# Patient Record
Sex: MALE | Race: WHITE | NOT HISPANIC OR LATINO | URBAN - METROPOLITAN AREA
[De-identification: names, ages, dates, MRNs, and addresses within clinical notes are randomized per-mention and may not be internally consistent; named-entity substitution may affect disease eponyms.]

---

## 2017-07-05 ENCOUNTER — EMERGENCY (EMERGENCY)
Facility: HOSPITAL | Age: 37
LOS: 1 days | Discharge: HOME | End: 2017-07-05
Attending: EMERGENCY MEDICINE
Payer: MEDICAID

## 2017-07-05 VITALS
RESPIRATION RATE: 20 BRPM | HEIGHT: 69 IN | HEART RATE: 86 BPM | WEIGHT: 177.03 LBS | TEMPERATURE: 98 F | OXYGEN SATURATION: 99 % | DIASTOLIC BLOOD PRESSURE: 80 MMHG | SYSTOLIC BLOOD PRESSURE: 125 MMHG
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
## 2017-07-05 DIAGNOSIS — M25.562 PAIN IN LEFT KNEE: ICD-10-CM
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
## 2017-07-05 DIAGNOSIS — M25.462 EFFUSION, LEFT KNEE: ICD-10-CM

## 2017-07-05 PROCEDURE — 73562 X-RAY EXAM OF KNEE 3: CPT | Mod: 26,LT

## 2017-07-05 PROCEDURE — 99283 EMERGENCY DEPT VISIT LOW MDM: CPT

## 2017-07-05 PROCEDURE — 99284 EMERGENCY DEPT VISIT MOD MDM: CPT | Mod: 25

## 2017-07-05 PROCEDURE — 73562 X-RAY EXAM OF KNEE 3: CPT

## 2017-07-05 RX ORDER — IBUPROFEN 200 MG
600 TABLET ORAL ONCE
Qty: 0 | Refills: 0 | Status: COMPLETED | OUTPATIENT
Start: 2017-07-05 | End: 2017-07-05

## 2017-07-05 RX ADMIN — Medication 600 MILLIGRAM(S): at 23:20

## 2017-07-05 RX ADMIN — Medication 600 MILLIGRAM(S): at 23:30

## 2017-07-05 NOTE — ED PROVIDER NOTE - OBJECTIVE STATEMENT
Friend as [place] . 37 y/o M pt with no significant PMHx and no significant PSHx presents to ED c/o L knee pain with associated L knee swelling s/p mechanical fall yesterday. Pt states he fell off of a bed yesterday while playing with his son, ultimately landing on his L knee. Pt also reports L knee pain is worse with extension. Pt denies numbness, tingling, or any other complaints. NKDA. Friend as [place] . 37 y/o M pt with no significant PMHx and no significant PSHx presents to ED c/o L knee pain with associated L knee swelling s/p mechanical fall yesterday. Pt states he fell off of a bed yesterday while playing with his son, landing on his L knee. Pt also reports L knee pain is worse with extension. Pt denies numbness, tingling, or any other complaints. NKDA.

## 2017-07-05 NOTE — ED PROVIDER NOTE - MEDICAL DECISION MAKING DETAILS
left knee pain after fall, full rom, exam pertinent for no swelling or bruising. xr no fx or dislocation. pain improved with meds. discussed anticipatory guidance. ortho referral in case pain persists

## 2017-07-05 NOTE — ED PROVIDER NOTE - CONDUCTED A DETAILED DISCUSSION WITH PATIENT AND/OR GUARDIAN REGARDING, MDM
radiology results/need for outpatient follow-up return to ED if symptoms worsen, persist or questions arise/radiology results/need for outpatient follow-up

## 2017-07-05 NOTE — ED PROVIDER NOTE - LOWER EXTREMITY EXAM, LEFT
L knee tenderness with varus stress; medial aspect of L knee is tender with no swelling or bruising; passive full range of motion

## 2017-08-08 ENCOUNTER — EMERGENCY (EMERGENCY)
Facility: HOSPITAL | Age: 37
LOS: 1 days | Discharge: HOME | End: 2017-08-08
Attending: EMERGENCY MEDICINE
Payer: MEDICAID

## 2017-08-08 VITALS
WEIGHT: 187.39 LBS | TEMPERATURE: 99 F | HEIGHT: 73.62 IN | DIASTOLIC BLOOD PRESSURE: 76 MMHG | SYSTOLIC BLOOD PRESSURE: 125 MMHG | RESPIRATION RATE: 15 BRPM | OXYGEN SATURATION: 97 % | HEART RATE: 96 BPM

## 2017-08-08 DIAGNOSIS — Z90.49 ACQUIRED ABSENCE OF OTHER SPECIFIED PARTS OF DIGESTIVE TRACT: ICD-10-CM

## 2017-08-08 DIAGNOSIS — Z90.49 ACQUIRED ABSENCE OF OTHER SPECIFIED PARTS OF DIGESTIVE TRACT: Chronic | ICD-10-CM

## 2017-08-08 DIAGNOSIS — G44.209 TENSION-TYPE HEADACHE, UNSPECIFIED, NOT INTRACTABLE: ICD-10-CM

## 2017-08-08 PROCEDURE — 99283 EMERGENCY DEPT VISIT LOW MDM: CPT

## 2017-08-08 PROCEDURE — 99284 EMERGENCY DEPT VISIT MOD MDM: CPT

## 2017-08-08 RX ORDER — TRAMADOL HYDROCHLORIDE 50 MG/1
25 TABLET ORAL ONCE
Qty: 0 | Refills: 0 | Status: DISCONTINUED | OUTPATIENT
Start: 2017-08-08 | End: 2017-08-08

## 2017-08-08 RX ORDER — ACETAMINOPHEN 500 MG
650 TABLET ORAL ONCE
Qty: 0 | Refills: 0 | Status: COMPLETED | OUTPATIENT
Start: 2017-08-08 | End: 2017-08-08

## 2017-08-08 RX ORDER — IBUPROFEN 200 MG
600 TABLET ORAL ONCE
Qty: 0 | Refills: 0 | Status: COMPLETED | OUTPATIENT
Start: 2017-08-08 | End: 2017-08-08

## 2017-08-08 RX ORDER — TRAMADOL HYDROCHLORIDE 50 MG/1
1 TABLET ORAL
Qty: 9 | Refills: 0 | OUTPATIENT
Start: 2017-08-08 | End: 2017-08-11

## 2017-08-08 RX ADMIN — TRAMADOL HYDROCHLORIDE 25 MILLIGRAM(S): 50 TABLET ORAL at 10:29

## 2017-08-08 RX ADMIN — Medication 600 MILLIGRAM(S): at 10:29

## 2017-08-08 RX ADMIN — Medication 650 MILLIGRAM(S): at 10:29

## 2017-08-08 NOTE — ED PROVIDER NOTE - PHYSICAL EXAMINATION
NEURO- uncomfortable when touching R side of head. Points tenderness at the R occipital and frontal forehead region.

## 2017-08-08 NOTE — ED PROVIDER NOTE - MEDICAL DECISION MAKING DETAILS
33 y/o M pt with no significant PMHx, active smoker and daily caffeine user presents with gradual onset R sided headache without any red flags. Likely a tension type headache. Will give tylenols, Motrin, Tramadol and reassess. No need for imaging or further invasive testing.

## 2017-08-08 NOTE — ED ADULT NURSE NOTE - OBJECTIVE STATEMENT
pt is here c/o severe headache , as per pt it started one week ago but increased in severity 2 days ago - pt denies nausea , pt is pacing holding his head , denies any trauma or vomiting

## 2017-08-08 NOTE — ED PROVIDER NOTE - PROGRESS NOTE DETAILS
villarreal: headache, improved.  no neurological deficit.  f/u with neurology and tramadol.   dx headache.  rx tramadol/tylenol/motrin.  f/u with neurology and return if symptoms worsens.

## 2017-08-08 NOTE — ED PROVIDER NOTE - CHPI ED SYMPTOMS NEG
no vomiting/no fever/no chills, no tooth ache, no URI sx, no neck stiffness, no trauma, no focal weakness

## 2017-08-08 NOTE — ED ADULT NURSE NOTE - CHPI ED SYMPTOMS NEG
no blurred vision/no change in level of consciousness/no vomiting/no loss of consciousness/no fever/no confusion/no dizziness

## 2017-08-08 NOTE — ED PROVIDER NOTE - OBJECTIVE STATEMENT
35 y/o M pt with no PMHx and a PSHx of appendectomy presents to ED c/o gradual onset of a worsening, stabbing R sided headache x1 week, improved with sleep. 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 Pt reports he went to see a neurologist yesterday and was given Gabapentin, to no relief. Pt denies taking any tylenols or Motrin. Pt also denies fever, chills, tooth ache, nausea, vomiting, URI sx, FHx of aneurysms, neck stiffness, trauma, focal weakness or any other complaints. Active smoker and daily caffeine use. NKDA.

## 2017-10-25 ENCOUNTER — EMERGENCY (EMERGENCY)
Facility: HOSPITAL | Age: 37
LOS: 1 days | Discharge: HOME | End: 2017-10-25
Attending: EMERGENCY MEDICINE | Admitting: EMERGENCY MEDICINE
Payer: MEDICAID

## 2017-10-25 VITALS
SYSTOLIC BLOOD PRESSURE: 140 MMHG | OXYGEN SATURATION: 100 % | TEMPERATURE: 98 F | HEART RATE: 84 BPM | RESPIRATION RATE: 16 BRPM | DIASTOLIC BLOOD PRESSURE: 94 MMHG

## 2017-10-25 DIAGNOSIS — Z90.49 ACQUIRED ABSENCE OF OTHER SPECIFIED PARTS OF DIGESTIVE TRACT: Chronic | ICD-10-CM

## 2017-10-25 LAB
ALBUMIN SERPL ELPH-MCNC: 4.8 G/DL — SIGNIFICANT CHANGE UP (ref 3.3–5)
ALP SERPL-CCNC: 72 U/L — SIGNIFICANT CHANGE UP (ref 40–120)
ALT FLD-CCNC: 29 U/L — SIGNIFICANT CHANGE UP (ref 4–41)
AST SERPL-CCNC: 26 U/L — SIGNIFICANT CHANGE UP (ref 4–40)
BASOPHILS # BLD AUTO: 0.05 K/UL — SIGNIFICANT CHANGE UP (ref 0–0.2)
BASOPHILS NFR BLD AUTO: 0.6 % — SIGNIFICANT CHANGE UP (ref 0–2)
BILIRUB SERPL-MCNC: 0.3 MG/DL — SIGNIFICANT CHANGE UP (ref 0.2–1.2)
BUN SERPL-MCNC: 24 MG/DL — HIGH (ref 7–23)
CALCIUM SERPL-MCNC: 9.6 MG/DL — SIGNIFICANT CHANGE UP (ref 8.4–10.5)
CHLORIDE SERPL-SCNC: 106 MMOL/L — SIGNIFICANT CHANGE UP (ref 98–107)
CO2 SERPL-SCNC: 26 MMOL/L — SIGNIFICANT CHANGE UP (ref 22–31)
CREAT SERPL-MCNC: 1.42 MG/DL — HIGH (ref 0.5–1.3)
EOSINOPHIL # BLD AUTO: 0.17 K/UL — SIGNIFICANT CHANGE UP (ref 0–0.5)
EOSINOPHIL NFR BLD AUTO: 2.2 % — SIGNIFICANT CHANGE UP (ref 0–6)
GLUCOSE SERPL-MCNC: 95 MG/DL — SIGNIFICANT CHANGE UP (ref 70–99)
HCT VFR BLD CALC: 43.1 % — SIGNIFICANT CHANGE UP (ref 39–50)
HGB BLD-MCNC: 13.9 G/DL — SIGNIFICANT CHANGE UP (ref 13–17)
IMM GRANULOCYTES # BLD AUTO: 0.02 # — SIGNIFICANT CHANGE UP
IMM GRANULOCYTES NFR BLD AUTO: 0.3 % — SIGNIFICANT CHANGE UP (ref 0–1.5)
LYMPHOCYTES # BLD AUTO: 2.23 K/UL — SIGNIFICANT CHANGE UP (ref 1–3.3)
LYMPHOCYTES # BLD AUTO: 28.9 % — SIGNIFICANT CHANGE UP (ref 13–44)
MCHC RBC-ENTMCNC: 27.9 PG — SIGNIFICANT CHANGE UP (ref 27–34)
MCHC RBC-ENTMCNC: 32.3 % — SIGNIFICANT CHANGE UP (ref 32–36)
MCV RBC AUTO: 86.5 FL — SIGNIFICANT CHANGE UP (ref 80–100)
MONOCYTES # BLD AUTO: 0.63 K/UL — SIGNIFICANT CHANGE UP (ref 0–0.9)
MONOCYTES NFR BLD AUTO: 8.2 % — SIGNIFICANT CHANGE UP (ref 2–14)
NEUTROPHILS # BLD AUTO: 4.61 K/UL — SIGNIFICANT CHANGE UP (ref 1.8–7.4)
NEUTROPHILS NFR BLD AUTO: 59.8 % — SIGNIFICANT CHANGE UP (ref 43–77)
NRBC # FLD: 0 — SIGNIFICANT CHANGE UP
PLATELET # BLD AUTO: 175 K/UL — SIGNIFICANT CHANGE UP (ref 150–400)
PMV BLD: 9.3 FL — SIGNIFICANT CHANGE UP (ref 7–13)
POTASSIUM SERPL-MCNC: 4.6 MMOL/L — SIGNIFICANT CHANGE UP (ref 3.5–5.3)
POTASSIUM SERPL-SCNC: 4.6 MMOL/L — SIGNIFICANT CHANGE UP (ref 3.5–5.3)
PROT SERPL-MCNC: 7.7 G/DL — SIGNIFICANT CHANGE UP (ref 6–8.3)
RBC # BLD: 4.98 M/UL — SIGNIFICANT CHANGE UP (ref 4.2–5.8)
RBC # FLD: 12.8 % — SIGNIFICANT CHANGE UP (ref 10.3–14.5)
SODIUM SERPL-SCNC: 142 MMOL/L — SIGNIFICANT CHANGE UP (ref 135–145)
WBC # BLD: 7.71 K/UL — SIGNIFICANT CHANGE UP (ref 3.8–10.5)
WBC # FLD AUTO: 7.71 K/UL — SIGNIFICANT CHANGE UP (ref 3.8–10.5)

## 2017-10-25 PROCEDURE — 99284 EMERGENCY DEPT VISIT MOD MDM: CPT

## 2017-10-25 RX ORDER — METOCLOPRAMIDE HCL 10 MG
10 TABLET ORAL ONCE
Qty: 0 | Refills: 0 | Status: COMPLETED | OUTPATIENT
Start: 2017-10-25 | End: 2017-10-25

## 2017-10-25 RX ORDER — DIPHENHYDRAMINE HCL 50 MG
50 CAPSULE ORAL ONCE
Qty: 0 | Refills: 0 | Status: COMPLETED | OUTPATIENT
Start: 2017-10-25 | End: 2017-10-25

## 2017-10-25 RX ORDER — MORPHINE SULFATE 50 MG/1
4 CAPSULE, EXTENDED RELEASE ORAL ONCE
Qty: 0 | Refills: 0 | Status: DISCONTINUED | OUTPATIENT
Start: 2017-10-25 | End: 2017-10-25

## 2017-10-25 RX ORDER — KETOROLAC TROMETHAMINE 30 MG/ML
30 SYRINGE (ML) INJECTION ONCE
Qty: 0 | Refills: 0 | Status: DISCONTINUED | OUTPATIENT
Start: 2017-10-25 | End: 2017-10-25

## 2017-10-25 RX ORDER — FAMOTIDINE 10 MG/ML
20 INJECTION INTRAVENOUS ONCE
Qty: 0 | Refills: 0 | Status: COMPLETED | OUTPATIENT
Start: 2017-10-25 | End: 2017-10-25

## 2017-10-25 RX ADMIN — Medication 30 MILLIGRAM(S): at 19:21

## 2017-10-25 RX ADMIN — MORPHINE SULFATE 4 MILLIGRAM(S): 50 CAPSULE, EXTENDED RELEASE ORAL at 19:20

## 2017-10-25 RX ADMIN — Medication 10 MILLIGRAM(S): at 19:22

## 2017-10-25 RX ADMIN — FAMOTIDINE 20 MILLIGRAM(S): 10 INJECTION INTRAVENOUS at 19:20

## 2017-10-25 RX ADMIN — Medication 50 MILLIGRAM(S): at 19:21

## 2017-10-25 NOTE — ED ADULT NURSE NOTE - OBJECTIVE STATEMENT
Hx of headaches x 2 years, pt c/o worsening headache today, had an MRI 1 month ago and told to be normal. Pt states the headache is on the right side of the head with nausea and photophobia. Pt denies blurry vision, vomiting, fever, chills, neck stiffness.

## 2017-10-25 NOTE — ED PROVIDER NOTE - OBJECTIVE STATEMENT
36M with hx of chronic headaches p/w worsening HA. Patient states he wakes up with headaches daily, but today the HA was more intense. HA is frontal and also in the back of his head. denies any visual changes or dizziness. (+) nausea today. patient is following with a neurologist, had an MRI brain 3 weeks ago which was normal.

## 2017-10-25 NOTE — ED PROVIDER NOTE - ENMT, MLM
Airway patent, Nasal mucosa clear. Mouth with normal mucosa. Throat has no vesicles, no oropharyngeal exudates and uvula is midline. TMs b/l clear

## 2017-10-25 NOTE — ED PROVIDER NOTE - ATTENDING CONTRIBUTION TO CARE
Pt was seen and evaluated by me. Pt states over the past 2 years having headaches and being followed by Neurology. Pt has had a recent MRI last month and received an injection to neck area yesterday. Pt states since having some increased pain to right head area with nausea. Pt denies any vision changes, vomiting, SOB, chest pain, or abd pain. Pt denies any weakness or numbness. EOMI b/l. PERRL. Lungs CTA b/l. RRR. Abd soft, non-tender. 5/5 b/l UE and LE. + distal pulses. Pt states taking Motrin 800mg with minimal relief. Was on Tramadol but stopped because he said he has some side effects from it.

## 2017-10-25 NOTE — ED ADULT TRIAGE NOTE - CHIEF COMPLAINT QUOTE
pt c/o HA x 2 months and nausea since today after reciving a msucle relaxant from PT. PT referred pt to ED for eval of HA. Pt denies blurry vision/vomiting/medical hx.

## 2017-10-25 NOTE — ED PROVIDER NOTE - EYES, MLM
Clear bilaterally, pupils equal, round and reactive to light. No nystagmus, no papilledema appreciated.

## 2017-10-25 NOTE — ED PROVIDER NOTE - PROGRESS NOTE DETAILS
Carlos: Matt noted, elevated. d/w patient. will f/u with his PMD. Avoidance of  NSAID use discussed.

## 2021-03-04 NOTE — ED ADULT NURSE NOTE - NS ED NOTE ABUSE RESPONSE YN
Yes
[FreeTextEntry1] : tylenol prn fever, f/u prn Mother advised pt needs well visit immunizations delayed

## 2024-03-18 NOTE — ED PROVIDER NOTE - PSH
Anesthesia Pre Eval Note    Anesthesia ROS/Med Hx        Anesthetic Complication History:    Patient does not have a history of anesthetic complications      Pulmonary Review:  Patient does not have a pulmonary history      Neuro/Psych Review:  Patient does not have a neuro/psych history         Cardiovascular Review:     Positive for hypertension  Positive for hyperlipidemia    GI/HEPATIC/RENAL Review:     Positive for GERD    End/Other Review:  Patient does not have an endo/other history        Relevant Problems   No relevant active problems       Physical Exam     Airway   Mallampati: I  TM Distance: >3 FB  Neck ROM: Full    Cardiovascular    Cardio Rhythm: Regular    General Assessment  General Assessment: Alert and oriented    Dental Exam  Dental exam normal    Pulmonary Exam    Breath sounds clear to auscultation:  Yes      Anesthesia Plan:    ASA Status: 2  Anesthesia Type: MAC    Induction: Intravenous  Maintenance: TIVA    Post-op Pain Management: Per Surgeon      Checklist  Reviewed: NPO Status, Allergies, Medications, Problem list, Past Med History and Patient Summary  Consent/Risks Discussed Statement:  The proposed anesthetic plan, including its risks and benefits, have been discussed with the Patient along with the risks and benefits of alternatives. Questions were encouraged and answered and the patient and/or representative understands and agrees to proceed.        I discussed with the patient (and/or patient's legal representative) the risks and benefits of the proposed anesthesia plan, MAC, which may include services performed by other anesthesia providers.    Alternative anesthesia plans, if available, were reviewed with the patient (and/or patient's legal representative). Discussion has been held with the patient (and/or patient's legal representative) regarding risks of anesthesia, which include Intra-operative Awareness and emergent situations that may require change in anesthesia plan.    The  patient (and/or patient's legal representative) has indicated understanding, his/her questions have been answered, and he/she wishes to proceed with the planned anesthetic.    Blood Products: Not Anticipated     History of appendectomy

## 2024-07-03 NOTE — ED ADULT NURSE NOTE - RESPIRATORY ASSESSMENT
11 year old male presents to ed with mother.  Mother states child has hx of constipation and was given a dose of miralax on 7/2/2024 at appx 3pm.   Patient reports having a small bowel movement prior to arrival to emergency department.   Abdomen appears firm.  Pain is located in lower abdominal area.   Patient had poor appetite x2 days  
WDL